# Patient Record
Sex: MALE | Race: WHITE | NOT HISPANIC OR LATINO | Employment: FULL TIME | URBAN - METROPOLITAN AREA
[De-identification: names, ages, dates, MRNs, and addresses within clinical notes are randomized per-mention and may not be internally consistent; named-entity substitution may affect disease eponyms.]

---

## 2023-12-06 ENCOUNTER — OFFICE VISIT (OUTPATIENT)
Dept: INTERNAL MEDICINE | Facility: CLINIC | Age: 33
End: 2023-12-06
Payer: COMMERCIAL

## 2023-12-06 VITALS
OXYGEN SATURATION: 95 % | HEART RATE: 85 BPM | WEIGHT: 315 LBS | TEMPERATURE: 97.1 F | HEIGHT: 70 IN | SYSTOLIC BLOOD PRESSURE: 104 MMHG | BODY MASS INDEX: 45.1 KG/M2 | DIASTOLIC BLOOD PRESSURE: 68 MMHG

## 2023-12-06 DIAGNOSIS — F33.1 MODERATE EPISODE OF RECURRENT MAJOR DEPRESSIVE DISORDER: ICD-10-CM

## 2023-12-06 DIAGNOSIS — Z78.9 MALE-TO-FEMALE TRANSGENDER PERSON: Primary | ICD-10-CM

## 2023-12-06 NOTE — PROGRESS NOTES
"Chief Complaint  Moderate episode of recurrent major depressive disorder (3 month f/u- Wanting to send Zoloft to mail order )    Subjective      History of Present Illness  Iris Cardozo is a 33 y.o. adult who presents to [facility] with a past medical history of  Past Medical History:   Diagnosis Date    Anxiety     Depression      States that she is doing well  Doing well with Endo and tolerating current medications    Blood pressure is doing well    Still planning for possible orchiectomy and/or laser hair removal in the future    Feels like anxiety is doing well on the Zoloft    Notices the Zoloft helped decrease his sexual drive which has actually been good    Objective   Vital Signs:   Vitals:    12/06/23 0904   BP: 104/68   Pulse: 85   Temp: 97.1 °F (36.2 °C)   TempSrc: Temporal   SpO2: 95%   Weight: (!) 155 kg (342 lb 3.2 oz)   Height: 177.8 cm (70\")     Body mass index is 49.1 kg/m².    Wt Readings from Last 3 Encounters:   12/06/23 (!) 155 kg (342 lb 3.2 oz)   10/21/23 (!) 157 kg (345 lb 7.4 oz)   10/15/23 (!) 159 kg (351 lb 9.6 oz)     BP Readings from Last 3 Encounters:   12/06/23 104/68   10/20/23 140/88   10/15/23 120/76       Health Maintenance   Topic Date Due    PAP SMEAR  Never done    HEPATITIS C SCREENING  01/01/2027 (Originally 6/11/2021)    BMI FOLLOWUP  05/01/2024    ANNUAL PHYSICAL  09/06/2024    TDAP/TD VACCINES (5 - Td or Tdap) 09/06/2033    COVID-19 Vaccine  Completed    INFLUENZA VACCINE  Completed    Pneumococcal Vaccine 0-64  Aged Out       Physical Exam  Vitals reviewed.   Constitutional:       Appearance: Normal appearance. She is well-developed. She is obese.   HENT:      Head: Normocephalic and atraumatic.      Right Ear: External ear normal.      Left Ear: External ear normal.   Eyes:      Conjunctiva/sclera: Conjunctivae normal.      Pupils: Pupils are equal, round, and reactive to light.   Cardiovascular:      Rate and Rhythm: " Normal rate and regular rhythm.      Heart sounds: No murmur heard.     No friction rub. No gallop.   Pulmonary:      Effort: Pulmonary effort is normal.      Breath sounds: Normal breath sounds. No wheezing or rhonchi.   Skin:     General: Skin is warm and dry.   Neurological:      Mental Status: She is alert and oriented to person, place, and time.   Psychiatric:         Mood and Affect: Affect normal.         Behavior: Behavior normal.         Thought Content: Thought content normal.            Result Review :  The following data was reviewed by: Radha Salmeron MD on 12/06/2023:      Procedures        Assessment and Plan   Diagnoses and all orders for this visit:    1. Male-to-female transgender person (Primary)  Comments:  Doing well continue to work with endocrinology they we will check labs we will review    2. Moderate episode of recurrent major depressive disorder  Comments:  Will send Zoloft doing quite well  Orders:  -     sertraline (ZOLOFT) 50 MG tablet; Take 1 tablet by mouth Daily.  Dispense: 90 tablet; Refill: 0                  FOLLOW UP  Return in about 3 months (around 3/6/2024).  Patient was given instructions and counseling regarding her condition or for health maintenance advice. Please see specific information pulled into the AVS if appropriate.       Radha Salmeron MD  12/06/23  10:16 EST    CURRENT & DISCONTINUED MEDICATIONS  Current Outpatient Medications   Medication Instructions    acetaminophen (TYLENOL) 500 mg, Oral, Every 6 Hours PRN    cetirizine (ZYRTEC) 10 mg, Oral, Daily    estradiol (ESTRACE) 1.5 mg, Oral    fluticasone (FLONASE) 50 MCG/ACT nasal spray No dose, route, or frequency recorded.    sertraline (ZOLOFT) 50 mg, Oral, Daily    spironolactone (ALDACTONE) 100 mg, Oral, 2 Times Daily       Medications Discontinued During This Encounter   Medication Reason    amoxicillin-clavulanate (AUGMENTIN) 875-125 MG per tablet *Therapy completed    azelastine (ASTELIN) 0.1 % nasal  spray *Therapy completed    brompheniramine-pseudoephedrine-DM 30-2-10 MG/5ML syrup *Therapy completed    sertraline (ZOLOFT) 50 MG tablet Reorder

## 2024-02-16 DIAGNOSIS — F33.1 MODERATE EPISODE OF RECURRENT MAJOR DEPRESSIVE DISORDER: ICD-10-CM

## 2024-03-06 ENCOUNTER — OFFICE VISIT (OUTPATIENT)
Dept: INTERNAL MEDICINE | Facility: CLINIC | Age: 34
End: 2024-03-06
Payer: COMMERCIAL

## 2024-03-06 VITALS
WEIGHT: 315 LBS | OXYGEN SATURATION: 96 % | HEART RATE: 95 BPM | DIASTOLIC BLOOD PRESSURE: 80 MMHG | TEMPERATURE: 96.6 F | BODY MASS INDEX: 45.1 KG/M2 | SYSTOLIC BLOOD PRESSURE: 100 MMHG | HEIGHT: 70 IN

## 2024-03-06 DIAGNOSIS — F41.9 ANXIETY: Primary | ICD-10-CM

## 2024-03-06 DIAGNOSIS — I10 HYPERTENSION, UNSPECIFIED TYPE: ICD-10-CM

## 2024-03-06 DIAGNOSIS — F33.1 MODERATE EPISODE OF RECURRENT MAJOR DEPRESSIVE DISORDER: ICD-10-CM

## 2024-03-06 DIAGNOSIS — Z78.9 MALE-TO-FEMALE TRANSGENDER PERSON: ICD-10-CM

## 2024-03-06 PROCEDURE — 99214 OFFICE O/P EST MOD 30 MIN: CPT | Performed by: INTERNAL MEDICINE

## 2024-03-06 RX ORDER — ESTRADIOL 2 MG/1
2 TABLET ORAL 2 TIMES DAILY
COMMUNITY
Start: 2023-12-15

## 2024-03-06 NOTE — PROGRESS NOTES
"Chief Complaint  Male-to-female transgender person (Vevletplasty scheduled on 4/20/24) and Depression (3 month f/u on the Zoloft 50 mg QD, doing good.)    Subjective      History of Present Illness  The patient presents for preoperative evaluation.    She is scheduled for a vulvoplasty  on 04/20/2024 by Dr. Moreno Armenta. She will be staying 3 to 4 days. Dr. Moreno Armenta wanted her to get a couple of tests done including nicotine testing and an EKG. She does not smoke. She has occasional breast tenderness since starting estrogen. She denies any chest discomfort with walking. She gets short of breath when she exercises, but other than that, she feels like she has not had as good breathing since she had COVID-19. She thinks it could also be due to her weight.    Her depression medications are working well for her.         Objective   Vital Signs:   Vitals:    03/06/24 1215   BP: 100/80   Pulse: 95   Temp: 96.6 °F (35.9 °C)   TempSrc: Temporal   SpO2: 96%   Weight: (!) 156 kg (344 lb 6.4 oz)   Height: 177.8 cm (70\")   PainSc: 0-No pain     Body mass index is 49.42 kg/m².    Wt Readings from Last 3 Encounters:   03/06/24 (!) 156 kg (344 lb 6.4 oz)   12/06/23 (!) 155 kg (342 lb 3.2 oz)   10/21/23 (!) 157 kg (345 lb 7.4 oz)     BP Readings from Last 3 Encounters:   03/06/24 100/80   12/06/23 104/68   10/20/23 140/88       Health Maintenance   Topic Date Due    PAP SMEAR  Never done    HEPATITIS C SCREENING  01/01/2027 (Originally 6/11/2021)    BMI FOLLOWUP  05/01/2024    ANNUAL PHYSICAL  09/06/2024    TDAP/TD VACCINES (5 - Td or Tdap) 09/06/2033    COVID-19 Vaccine  Completed    INFLUENZA VACCINE  Completed    Pneumococcal Vaccine 0-64  Aged Out       Physical Exam  Vitals reviewed.   Constitutional:       Appearance: Normal appearance. She is well-developed.   HENT:      Head: Normocephalic and atraumatic.      Right Ear: External ear normal.      Left Ear: External ear normal.   Eyes:      Conjunctiva/sclera: Conjunctivae " normal.      Pupils: Pupils are equal, round, and reactive to light.   Cardiovascular:      Rate and Rhythm: Normal rate and regular rhythm.      Heart sounds: No murmur heard.     No friction rub. No gallop.   Pulmonary:      Effort: Pulmonary effort is normal.      Breath sounds: Normal breath sounds. No wheezing or rhonchi.   Skin:     General: Skin is warm and dry.   Neurological:      Mental Status: She is alert and oriented to person, place, and time.   Psychiatric:         Mood and Affect: Affect normal.         Behavior: Behavior normal.         Thought Content: Thought content normal.        Physical Exam        Result Review :  The following data was reviewed by: Radha Salmeron MD on 03/06/2024:         Results          ECG 12 Lead    Date/Time: 3/7/2024 1:41 PM  Performed by: Radha Salmeron MD    Authorized by: Radha Salmeron MD  Previous ECG: no previous ECG available  Rhythm: sinus rhythm    Clinical impression: normal ECG                Assessment & Plan  1. Preoperative evaluation.  I will obtain CBC, CMP, and coagulation panel. I will also obtain an EKG.  Appears to have no higher risks for surgery.    2. Depression.  She is doing well on her current medication regimen.    Follow-up  The patient will follow up in 3 months.    Assessment & Plan  Anxiety    Moderate episode of recurrent major depressive disorder    Hypertension, unspecified type    Male-to-female transgender person    Orders Placed This Encounter   Procedures    Comprehensive metabolic panel    APTT    Protime-INR    Thrombin Time    Fibrinogen    Nicotine & Metabolite, Quant    ECG 12 Lead    CBC w AUTO Differential                         FOLLOW UP  Return in about 3 months (around 6/6/2024).  Patient was given instructions and counseling regarding her condition or for health maintenance advice. Please see specific information pulled into the AVS if appropriate.       Radha Salmeron MD  03/07/24  13:20  EST    CURRENT & DISCONTINUED MEDICATIONS  Current Outpatient Medications   Medication Instructions    acetaminophen (TYLENOL) 500 mg, Oral, Every 6 Hours PRN    cetirizine (ZYRTEC) 10 mg, Oral, Daily    estradiol (ESTRACE) 2 mg, Oral, 2 Times Daily    fluticasone (FLONASE) 50 MCG/ACT nasal spray No dose, route, or frequency recorded.    sertraline (ZOLOFT) 50 mg, Oral, Daily    spironolactone (ALDACTONE) 100 mg, Oral, 2 Times Daily       Medications Discontinued During This Encounter   Medication Reason    estradiol (ESTRACE) 1 MG tablet *Therapy completed        Patient or patient representative verbalized consent for the use of Ambient Listening during the visit with  Radha Salmeron MD for chart documentation. 3/7/2024  13:42 EST

## 2024-03-07 PROCEDURE — 93000 ELECTROCARDIOGRAM COMPLETE: CPT | Performed by: INTERNAL MEDICINE

## 2024-04-15 ENCOUNTER — PATIENT MESSAGE (OUTPATIENT)
Dept: INTERNAL MEDICINE | Facility: CLINIC | Age: 34
End: 2024-04-15
Payer: COMMERCIAL

## 2024-04-15 NOTE — LETTER
[address] [facility]  [address]  [facility] [address]  Phone: [phone]  Fax: [phone]       To whom it may concern,

## 2024-04-23 NOTE — TELEPHONE ENCOUNTER
Yes that is fine.  Just have it say I am seeing nicholas and have diagnosed him with ADHD and he is currently receiving medical treatment which I am managing.  Thanks!

## 2024-05-07 ENCOUNTER — TELEPHONE (OUTPATIENT)
Dept: INTERNAL MEDICINE | Facility: CLINIC | Age: 34
End: 2024-05-07
Payer: COMMERCIAL

## 2024-05-26 ENCOUNTER — LAB REQUISITION (OUTPATIENT)
Dept: LAB | Facility: HOSPITAL | Age: 34
End: 2024-05-26
Payer: COMMERCIAL

## 2024-05-26 DIAGNOSIS — F64.1 DUAL ROLE TRANSVESTISM: ICD-10-CM

## 2024-05-26 LAB
ANION GAP SERPL CALCULATED.3IONS-SCNC: 8.8 MMOL/L (ref 5–15)
BASOPHILS # BLD AUTO: 0.02 10*3/MM3 (ref 0–0.2)
BASOPHILS NFR BLD AUTO: 0.1 % (ref 0–1.5)
BUN SERPL-MCNC: 8 MG/DL (ref 6–20)
BUN/CREAT SERPL: 10.5 (ref 7–25)
CALCIUM SPEC-SCNC: 8.5 MG/DL (ref 8.6–10.5)
CHLORIDE SERPL-SCNC: 102 MMOL/L (ref 98–107)
CO2 SERPL-SCNC: 26.2 MMOL/L (ref 22–29)
CREAT SERPL-MCNC: 0.76 MG/DL (ref 0.76–1.27)
DEPRECATED RDW RBC AUTO: 40 FL (ref 37–54)
EGFRCR SERPLBLD CKD-EPI 2021: 121 ML/MIN/1.73
EOSINOPHIL # BLD AUTO: 0 10*3/MM3 (ref 0–0.4)
EOSINOPHIL NFR BLD AUTO: 0 % (ref 0.3–6.2)
ERYTHROCYTE [DISTWIDTH] IN BLOOD BY AUTOMATED COUNT: 11.6 % (ref 12.3–15.4)
GLUCOSE SERPL-MCNC: 151 MG/DL (ref 65–99)
HCT VFR BLD AUTO: 33.9 % (ref 37.5–51)
HGB BLD-MCNC: 11.1 G/DL (ref 13–17.7)
IMM GRANULOCYTES # BLD AUTO: 0.07 10*3/MM3 (ref 0–0.05)
IMM GRANULOCYTES NFR BLD AUTO: 0.5 % (ref 0–0.5)
LYMPHOCYTES # BLD AUTO: 1.69 10*3/MM3 (ref 0.7–3.1)
LYMPHOCYTES NFR BLD AUTO: 11.4 % (ref 19.6–45.3)
MCH RBC QN AUTO: 30.8 PG (ref 26.6–33)
MCHC RBC AUTO-ENTMCNC: 32.7 G/DL (ref 31.5–35.7)
MCV RBC AUTO: 94.2 FL (ref 79–97)
MONOCYTES # BLD AUTO: 1.26 10*3/MM3 (ref 0.1–0.9)
MONOCYTES NFR BLD AUTO: 8.5 % (ref 5–12)
NEUTROPHILS NFR BLD AUTO: 11.8 10*3/MM3 (ref 1.7–7)
NEUTROPHILS NFR BLD AUTO: 79.5 % (ref 42.7–76)
NRBC BLD AUTO-RTO: 0 /100 WBC (ref 0–0.2)
PLATELET # BLD AUTO: 247 10*3/MM3 (ref 140–450)
PMV BLD AUTO: 9.9 FL (ref 6–12)
POTASSIUM SERPL-SCNC: 3.9 MMOL/L (ref 3.5–5.2)
RBC # BLD AUTO: 3.6 10*6/MM3 (ref 4.14–5.8)
SODIUM SERPL-SCNC: 137 MMOL/L (ref 136–145)
WBC NRBC COR # BLD AUTO: 14.84 10*3/MM3 (ref 3.4–10.8)

## 2024-05-26 PROCEDURE — 85025 COMPLETE CBC W/AUTO DIFF WBC: CPT | Performed by: UROLOGY

## 2024-05-26 PROCEDURE — 80048 BASIC METABOLIC PNL TOTAL CA: CPT | Performed by: UROLOGY

## 2024-06-28 ENCOUNTER — OFFICE VISIT (OUTPATIENT)
Dept: INTERNAL MEDICINE | Facility: CLINIC | Age: 34
End: 2024-06-28
Payer: COMMERCIAL

## 2024-06-28 ENCOUNTER — PRIOR AUTHORIZATION (OUTPATIENT)
Dept: INTERNAL MEDICINE | Facility: CLINIC | Age: 34
End: 2024-06-28

## 2024-06-28 VITALS
BODY MASS INDEX: 45.1 KG/M2 | DIASTOLIC BLOOD PRESSURE: 78 MMHG | TEMPERATURE: 97.7 F | HEIGHT: 70 IN | OXYGEN SATURATION: 96 % | RESPIRATION RATE: 20 BRPM | HEART RATE: 95 BPM | SYSTOLIC BLOOD PRESSURE: 124 MMHG | WEIGHT: 315 LBS

## 2024-06-28 DIAGNOSIS — Z78.9 MALE-TO-FEMALE TRANSGENDER PERSON: ICD-10-CM

## 2024-06-28 DIAGNOSIS — E66.01 CLASS 3 SEVERE OBESITY WITHOUT SERIOUS COMORBIDITY WITH BODY MASS INDEX (BMI) OF 50.0 TO 59.9 IN ADULT, UNSPECIFIED OBESITY TYPE: Primary | ICD-10-CM

## 2024-06-28 PROCEDURE — 99214 OFFICE O/P EST MOD 30 MIN: CPT | Performed by: INTERNAL MEDICINE

## 2024-06-28 RX ORDER — SEMAGLUTIDE 0.25 MG/.5ML
0.25 INJECTION, SOLUTION SUBCUTANEOUS WEEKLY
Qty: 2 ML | Refills: 1 | Status: SHIPPED | OUTPATIENT
Start: 2024-06-28 | End: 2024-07-02 | Stop reason: SDUPTHER

## 2024-06-28 RX ORDER — SEMAGLUTIDE 0.25 MG/.5ML
0.25 INJECTION, SOLUTION SUBCUTANEOUS WEEKLY
Qty: 2.5 ML | Refills: 1 | Status: SHIPPED | OUTPATIENT
Start: 2024-06-28 | End: 2024-06-28

## 2024-06-28 RX ORDER — IBUPROFEN 800 MG/1
800 TABLET ORAL EVERY 8 HOURS PRN
COMMUNITY

## 2024-06-28 NOTE — TELEPHONE ENCOUNTER
Wegovy 0.25MG/0.5ML auto-injectors    The authorization is valid from 05/29/2024 through 12/25/2024. A letter of explanation will also be  mailed to the patient.

## 2024-06-28 NOTE — PROGRESS NOTES
"Chief Complaint  Follow-up (3 month follow up for hypertension, depression, anxiety, also post surgery ), Anxiety, Hypertension, and Depression      Subjective      History of Present Illness  The patient presents for evaluation status post vulvoplasty    The patient reports overall good health post-surgery, however, she experiences pain around the clitori and perineal area. She has experienced some bruising in the vaginal area over the past few days, which has since resolved. She has observed that her right labia is more mobile than the left, with the left side exhibiting more swelling than the right. Her urinary function is normal, with no reported dysuria. She has undergone 97 absorbable sutures and has a follow-up appointment scheduled for next week. She denies experiencing any chest discomfort or respiratory distress. She applied bacitracin to the area the previous night.    She is frustrated with her weight and would like to try something to help with this         Objective   Vital Signs:   Vitals:    06/28/24 1149   BP: 124/78   BP Location: Right arm   Patient Position: Sitting   Cuff Size: Adult   Pulse: 95   Resp: 20   Temp: 97.7 °F (36.5 °C)   TempSrc: Temporal   SpO2: 96%   Weight: (!) 158 kg (349 lb)   Height: 176.5 cm (69.5\")     Body mass index is 50.8 kg/m².    Wt Readings from Last 3 Encounters:   06/28/24 (!) 158 kg (349 lb)   03/06/24 (!) 156 kg (344 lb 6.4 oz)   12/06/23 (!) 155 kg (342 lb 3.2 oz)     BP Readings from Last 3 Encounters:   06/28/24 124/78   03/06/24 100/80   12/06/23 104/68       Health Maintenance   Topic Date Due    BMI FOLLOWUP  05/01/2024    HEPATITIS C SCREENING  01/01/2027 (Originally 6/11/2021)    INFLUENZA VACCINE  08/01/2024    ANNUAL PHYSICAL  09/06/2024    TDAP/TD VACCINES (5 - Td or Tdap) 09/06/2033    COVID-19 Vaccine  Completed    Pneumococcal Vaccine 0-64  Aged Out    PAP SMEAR  Discontinued       Physical Exam  Vitals reviewed.   Constitutional:       Appearance: " Normal appearance. She is well-developed.   HENT:      Head: Normocephalic and atraumatic.      Right Ear: External ear normal.      Left Ear: External ear normal.   Eyes:      Conjunctiva/sclera: Conjunctivae normal.      Pupils: Pupils are equal, round, and reactive to light.   Cardiovascular:      Rate and Rhythm: Normal rate and regular rhythm.      Heart sounds: No murmur heard.     No friction rub. No gallop.   Pulmonary:      Effort: Pulmonary effort is normal.      Breath sounds: Normal breath sounds. No wheezing or rhonchi.   Genitourinary:     Comments: Surgical area is healing very well; vulva still somewhat swollen but doing well.  Incision sites not infected.  Skin:     General: Skin is warm and dry.   Neurological:      Mental Status: She is alert and oriented to person, place, and time.   Psychiatric:         Mood and Affect: Affect normal.         Behavior: Behavior normal.         Thought Content: Thought content normal.        Physical Exam  Heart sounds are normal.      Result Review :  The following data was reviewed by: Radha Salmeron MD on 06/28/2024:         Results             Procedures            Assessment & Plan  Class 3 severe obesity without serious comorbidity with body mass index (BMI) of 50.0 to 59.9 in adult, unspecified obesity type    Male-to-female transgender person       New Medications Ordered This Visit   Medications    Semaglutide-Weight Management (Wegovy) 0.25 MG/0.5ML solution auto-injector     Sig: Inject 0.5 mL under the skin into the appropriate area as directed 1 (One) Time Per Week.     Dispense:  2 mL     Refill:  1          Assessment & Plan  1. Post-surgery status.  The patient's surgical wound appears to be healing well. The patient is advised to continue applying bacitracin until the sutures fall out and follow up with urolgoy    2. Obesity  Will try wegovy; warned of side effects    Patient or patient representative verbalized consent for the use of Ambient  Listening during the visit with  Radha Salmeron MD for chart documentation. 6/28/2024  13:04 EDT      FOLLOW UP  No follow-ups on file.  Patient was given instructions and counseling regarding her condition or for health maintenance advice. Please see specific information pulled into the AVS if appropriate.     Radha Salmeron MD  06/28/24  13:17 EDT    CURRENT & DISCONTINUED MEDICATIONS  Current Outpatient Medications   Medication Instructions    acetaminophen (TYLENOL) 500 mg, Oral, Every 6 Hours PRN    cetirizine (ZYRTEC) 10 mg, Oral, Daily    estradiol (ESTRACE) 2 mg, Oral, 2 Times Daily    fluticasone (FLONASE) 50 MCG/ACT nasal spray No dose, route, or frequency recorded.    ibuprofen (ADVIL,MOTRIN) 800 mg, Oral, Every 8 Hours PRN    sertraline (ZOLOFT) 50 mg, Oral, Daily    Wegovy 0.25 mg, Subcutaneous, Weekly       Medications Discontinued During This Encounter   Medication Reason    spironolactone (ALDACTONE) 100 MG tablet     Semaglutide-Weight Management (Wegovy) 0.25 MG/0.5ML solution auto-injector

## 2024-07-02 DIAGNOSIS — E66.01 CLASS 3 SEVERE OBESITY WITHOUT SERIOUS COMORBIDITY WITH BODY MASS INDEX (BMI) OF 50.0 TO 59.9 IN ADULT, UNSPECIFIED OBESITY TYPE: Primary | ICD-10-CM

## 2024-07-02 DIAGNOSIS — E66.01 CLASS 3 SEVERE OBESITY WITHOUT SERIOUS COMORBIDITY WITH BODY MASS INDEX (BMI) OF 50.0 TO 59.9 IN ADULT, UNSPECIFIED OBESITY TYPE: ICD-10-CM

## 2024-07-02 RX ORDER — SEMAGLUTIDE 0.25 MG/.5ML
0.25 INJECTION, SOLUTION SUBCUTANEOUS WEEKLY
Qty: 2 ML | Refills: 1 | Status: SHIPPED | OUTPATIENT
Start: 2024-07-02 | End: 2024-07-02 | Stop reason: SDUPTHER

## 2024-07-02 RX ORDER — SEMAGLUTIDE 0.25 MG/.5ML
0.25 INJECTION, SOLUTION SUBCUTANEOUS WEEKLY
Qty: 2 ML | Refills: 1 | Status: SHIPPED | OUTPATIENT
Start: 2024-07-02

## 2024-07-08 ENCOUNTER — PATIENT MESSAGE (OUTPATIENT)
Dept: INTERNAL MEDICINE | Facility: CLINIC | Age: 34
End: 2024-07-08
Payer: COMMERCIAL

## 2024-07-10 RX ORDER — ONDANSETRON 4 MG/1
4 TABLET, FILM COATED ORAL EVERY 8 HOURS PRN
Qty: 10 TABLET | Refills: 1 | Status: SHIPPED | OUTPATIENT
Start: 2024-07-10

## 2024-07-10 NOTE — TELEPHONE ENCOUNTER
From: Iris Cardozo  To: Radha Salmeron  Sent: 7/8/2024 4:10 PM EDT  Subject: Nausea with Wagovy    I have been having some nausea since starting the Wagovy which is a normal side effect. I had some Zofran prescribed from my hospital stay and I used that after talking to my EMT friend who also took Wagovy and it helped a lot. Would you be willing to write me a prescription for Zofran for use with the nausea symptoms?    Thanks  Iris Cardozo

## 2024-08-29 DIAGNOSIS — F33.1 MODERATE EPISODE OF RECURRENT MAJOR DEPRESSIVE DISORDER: ICD-10-CM

## 2024-09-10 ENCOUNTER — PATIENT MESSAGE (OUTPATIENT)
Dept: INTERNAL MEDICINE | Facility: CLINIC | Age: 34
End: 2024-09-10
Payer: COMMERCIAL

## 2024-09-24 ENCOUNTER — PATIENT MESSAGE (OUTPATIENT)
Dept: INTERNAL MEDICINE | Facility: CLINIC | Age: 34
End: 2024-09-24
Payer: COMMERCIAL

## 2024-09-26 RX ORDER — SEMAGLUTIDE 1 MG/.5ML
1 INJECTION, SOLUTION SUBCUTANEOUS WEEKLY
Qty: 6 ML | Refills: 1 | Status: SHIPPED | OUTPATIENT
Start: 2024-09-26

## 2024-10-04 ENCOUNTER — OFFICE VISIT (OUTPATIENT)
Dept: INTERNAL MEDICINE | Facility: CLINIC | Age: 34
End: 2024-10-04
Payer: COMMERCIAL

## 2024-10-04 VITALS
HEIGHT: 70 IN | WEIGHT: 315 LBS | SYSTOLIC BLOOD PRESSURE: 136 MMHG | HEART RATE: 81 BPM | DIASTOLIC BLOOD PRESSURE: 84 MMHG | BODY MASS INDEX: 45.1 KG/M2 | OXYGEN SATURATION: 96 % | TEMPERATURE: 97.2 F

## 2024-10-04 DIAGNOSIS — I10 HYPERTENSION, UNSPECIFIED TYPE: ICD-10-CM

## 2024-10-04 DIAGNOSIS — Z78.9 MALE-TO-FEMALE TRANSGENDER PERSON: ICD-10-CM

## 2024-10-04 DIAGNOSIS — F33.1 MODERATE EPISODE OF RECURRENT MAJOR DEPRESSIVE DISORDER: Primary | ICD-10-CM

## 2024-10-04 DIAGNOSIS — F41.9 ANXIETY: ICD-10-CM

## 2024-10-04 LAB
ALBUMIN SERPL-MCNC: 4.4 G/DL (ref 3.5–5.2)
ALBUMIN UR-MCNC: <1.2 MG/DL
ALBUMIN/GLOB SERPL: 1.5 G/DL
ALP SERPL-CCNC: 67 U/L (ref 39–117)
ALT SERPL W P-5'-P-CCNC: 104 U/L (ref 1–41)
ANION GAP SERPL CALCULATED.3IONS-SCNC: 12.1 MMOL/L (ref 5–15)
AST SERPL-CCNC: 100 U/L (ref 1–40)
BASOPHILS # BLD AUTO: 0.03 10*3/MM3 (ref 0–0.2)
BASOPHILS NFR BLD AUTO: 0.4 % (ref 0–1.5)
BILIRUB SERPL-MCNC: 0.4 MG/DL (ref 0–1.2)
BUN SERPL-MCNC: 8 MG/DL (ref 6–20)
BUN/CREAT SERPL: 11.1 (ref 7–25)
CALCIUM SPEC-SCNC: 9.8 MG/DL (ref 8.6–10.5)
CHLORIDE SERPL-SCNC: 100 MMOL/L (ref 98–107)
CHOLEST SERPL-MCNC: 178 MG/DL (ref 0–200)
CO2 SERPL-SCNC: 26.9 MMOL/L (ref 22–29)
CREAT SERPL-MCNC: 0.72 MG/DL (ref 0.76–1.27)
DEPRECATED RDW RBC AUTO: 41.6 FL (ref 37–54)
EGFRCR SERPLBLD CKD-EPI 2021: 122.9 ML/MIN/1.73
EOSINOPHIL # BLD AUTO: 0.22 10*3/MM3 (ref 0–0.4)
EOSINOPHIL NFR BLD AUTO: 2.9 % (ref 0.3–6.2)
ERYTHROCYTE [DISTWIDTH] IN BLOOD BY AUTOMATED COUNT: 12.8 % (ref 12.3–15.4)
GLOBULIN UR ELPH-MCNC: 2.9 GM/DL
GLUCOSE SERPL-MCNC: 75 MG/DL (ref 65–99)
HBA1C MFR BLD: 5.7 % (ref 4.8–5.6)
HCT VFR BLD AUTO: 42 % (ref 37.5–51)
HDLC SERPL-MCNC: 36 MG/DL (ref 40–60)
HGB BLD-MCNC: 13.6 G/DL (ref 13–17.7)
IMM GRANULOCYTES # BLD AUTO: 0.02 10*3/MM3 (ref 0–0.05)
IMM GRANULOCYTES NFR BLD AUTO: 0.3 % (ref 0–0.5)
LDLC SERPL CALC-MCNC: 112 MG/DL (ref 0–100)
LDLC/HDLC SERPL: 2.99 {RATIO}
LYMPHOCYTES # BLD AUTO: 2.03 10*3/MM3 (ref 0.7–3.1)
LYMPHOCYTES NFR BLD AUTO: 26.8 % (ref 19.6–45.3)
MCH RBC QN AUTO: 28.8 PG (ref 26.6–33)
MCHC RBC AUTO-ENTMCNC: 32.4 G/DL (ref 31.5–35.7)
MCV RBC AUTO: 89 FL (ref 79–97)
MONOCYTES # BLD AUTO: 0.59 10*3/MM3 (ref 0.1–0.9)
MONOCYTES NFR BLD AUTO: 7.8 % (ref 5–12)
NEUTROPHILS NFR BLD AUTO: 4.68 10*3/MM3 (ref 1.7–7)
NEUTROPHILS NFR BLD AUTO: 61.8 % (ref 42.7–76)
NRBC BLD AUTO-RTO: 0 /100 WBC (ref 0–0.2)
PLATELET # BLD AUTO: 232 10*3/MM3 (ref 140–450)
PMV BLD AUTO: 10.8 FL (ref 6–12)
POTASSIUM SERPL-SCNC: 3.9 MMOL/L (ref 3.5–5.2)
PROT SERPL-MCNC: 7.3 G/DL (ref 6–8.5)
RBC # BLD AUTO: 4.72 10*6/MM3 (ref 4.14–5.8)
SODIUM SERPL-SCNC: 139 MMOL/L (ref 136–145)
TRIGL SERPL-MCNC: 172 MG/DL (ref 0–150)
TSH SERPL DL<=0.05 MIU/L-ACNC: 2.02 UIU/ML (ref 0.27–4.2)
VLDLC SERPL-MCNC: 30 MG/DL (ref 5–40)
WBC NRBC COR # BLD AUTO: 7.57 10*3/MM3 (ref 3.4–10.8)

## 2024-10-04 PROCEDURE — 83036 HEMOGLOBIN GLYCOSYLATED A1C: CPT | Performed by: INTERNAL MEDICINE

## 2024-10-04 PROCEDURE — 82043 UR ALBUMIN QUANTITATIVE: CPT | Performed by: INTERNAL MEDICINE

## 2024-10-04 PROCEDURE — 80050 GENERAL HEALTH PANEL: CPT | Performed by: INTERNAL MEDICINE

## 2024-10-04 PROCEDURE — 80061 LIPID PANEL: CPT | Performed by: INTERNAL MEDICINE

## 2024-10-04 NOTE — PROGRESS NOTES
"Chief Complaint  Obesity      Subjective      History of Present Illness  The patient presents for evaluation of multiple medical concerns.    She has recently started taking Wegovy 1 mg, with her second dose administered last night. The medication has been effective in reducing her appetite, leading to a weight loss of 13 pounds over the past 3 months. However, she experiences increased stomach discomfort and nausea when she doesn't eat, which she is trying to manage by consuming fruits and nuts as snacks.  She did notice worsening carsickness she carries Dramamine for nausea, which she describes as tolerable. She has not vomited and did not fill the prescription for nausea.    She is recovering well from surgery, as confirmed by a recent follow-up appointment with Dr. Armenta. Her hormone levels are being managed by Dr. Duff, who aims to maintain her estrogen levels between 100 and 200.     She has undergone laser treatment for hair removal.    She takes Zoloft 50 mg intermittently, which she finds effective, and also takes Zyrtec. She reports no side effects from these medications. She does not have a blood pressure cuff at home.          Objective   Vital Signs:   Vitals:    10/04/24 0817   BP: 136/84   Pulse: 81   Temp: 97.2 °F (36.2 °C)   SpO2: 96%   Weight: (!) 153 kg (337 lb 6.4 oz)   Height: 176.5 cm (69.5\")     Body mass index is 49.11 kg/m².    Wt Readings from Last 3 Encounters:   10/04/24 (!) 153 kg (337 lb 6.4 oz)   06/28/24 (!) 158 kg (349 lb)   03/06/24 (!) 156 kg (344 lb 6.4 oz)     BP Readings from Last 3 Encounters:   10/04/24 136/84   06/28/24 124/78   03/06/24 100/80       Health Maintenance   Topic Date Due    INFLUENZA VACCINE  08/01/2024    ANNUAL PHYSICAL  09/06/2024    HEPATITIS C SCREENING  01/01/2027 (Originally 6/11/2021)    BMI FOLLOWUP  09/26/2025    TDAP/TD VACCINES (5 - Td or Tdap) 09/06/2033    COVID-19 Vaccine  Completed    Pneumococcal Vaccine 0-64  Aged Out    PAP SMEAR  " Discontinued       Physical Exam  Vitals reviewed.   Constitutional:       Appearance: Normal appearance. She is well-developed. She is obese.   HENT:      Head: Normocephalic and atraumatic.      Right Ear: External ear normal.      Left Ear: External ear normal.   Eyes:      Conjunctiva/sclera: Conjunctivae normal.      Pupils: Pupils are equal, round, and reactive to light.   Cardiovascular:      Rate and Rhythm: Normal rate and regular rhythm.      Heart sounds: No murmur heard.     No friction rub. No gallop.   Pulmonary:      Effort: Pulmonary effort is normal.      Breath sounds: Normal breath sounds. No wheezing or rhonchi.   Skin:     General: Skin is warm and dry.   Neurological:      Mental Status: She is alert and oriented to person, place, and time.   Psychiatric:         Mood and Affect: Affect normal.         Behavior: Behavior normal.         Thought Content: Thought content normal.        Physical Exam        Result Review :  The following data was reviewed by: Radha Salmeron MD on 10/04/2024:         Results              Procedures            Assessment & Plan  Moderate episode of recurrent major depressive disorder    Male-to-female transgender person    Hypertension, unspecified type    Anxiety      Orders Placed This Encounter   Procedures    Miscellaneous DME    Fluzone >6mos    COVID-19 (Pfizer) 12yrs+ (COMIRNATY)    Comprehensive Metabolic Panel    Lipid Panel    TSH    MicroAlbumin, Urine, Random - Urine, Clean Catch    Hemoglobin A1c    CBC & Differential             Assessment & Plan  1. Weight management.  She has achieved a weight loss of 13 pounds over the past 3 months. It was explained that rapid weight loss could lead to skin sagging and muscle loss. She was advised to incorporate muscular activities into her routine and to consume small, frequent meals throughout the day to manage nausea associated with Wegovy.     2. Elevated blood pressure.  Her blood pressure is slightly  elevated today. She was advised to monitor her blood pressure at home once or twice a week. A prescription for a blood pressure cuff will be provided.    3. Hormone replacement therapy.  Her estrogen dosage was recently increased to maintain levels around 100-200. Regular blood tests were recommended to monitor hormone levels, especially considering the weight loss.    4. Depression.  She is currently on sertraline 50 mg but has not been taking it consistently. She was encouraged to take it regularly and consider using a pill organizer to help with consistency.    5. Health Maintenance.  Lab tests including blood count, liver enzymes, A1c, cholesterol level, urine protein level, and screening thyroid level will be conducted. Influenza and COVID-19 vaccines were recommended and will be administered today.    Follow-up  Return in 3 months for follow up.    Patient or patient representative verbalized consent for the use of Ambient Listening during the visit with  Radha Salmeron MD for chart documentation. 10/4/2024  09:11 EDT      FOLLOW UP  Return in about 3 months (around 1/4/2025).  Patient was given instructions and counseling regarding her condition or for health maintenance advice. Please see specific information pulled into the AVS if appropriate.     Radha Salmeron MD  10/04/24  09:53 EDT    CURRENT & DISCONTINUED MEDICATIONS  Current Outpatient Medications   Medication Instructions    acetaminophen (TYLENOL) 500 mg, Oral, Every 6 Hours PRN    cetirizine (ZYRTEC) 10 mg, Oral, Daily    estradiol (ESTRACE) 2.5 mg, Oral, 2 Times Daily    fluticasone (FLONASE) 50 MCG/ACT nasal spray No dose, route, or frequency recorded.    ibuprofen (ADVIL,MOTRIN) 800 mg, Oral, Every 8 Hours PRN    ondansetron (ZOFRAN) 4 mg, Oral, Every 8 Hours PRN    sertraline (ZOLOFT) 50 mg, Oral, Daily    Wegovy 1 mg, Subcutaneous, Weekly       There are no discontinued medications.

## 2024-10-08 DIAGNOSIS — R74.8 ELEVATED LIVER ENZYMES: Primary | ICD-10-CM

## 2024-10-11 ENCOUNTER — PATIENT MESSAGE (OUTPATIENT)
Dept: INTERNAL MEDICINE | Facility: CLINIC | Age: 34
End: 2024-10-11
Payer: COMMERCIAL

## 2024-11-01 ENCOUNTER — OFFICE VISIT (OUTPATIENT)
Dept: SLEEP MEDICINE | Facility: HOSPITAL | Age: 34
End: 2024-11-01
Payer: COMMERCIAL

## 2024-11-01 VITALS
HEIGHT: 70 IN | OXYGEN SATURATION: 97 % | DIASTOLIC BLOOD PRESSURE: 79 MMHG | SYSTOLIC BLOOD PRESSURE: 134 MMHG | HEART RATE: 86 BPM | BODY MASS INDEX: 45.1 KG/M2 | WEIGHT: 315 LBS

## 2024-11-01 DIAGNOSIS — F32.A DEPRESSION, UNSPECIFIED DEPRESSION TYPE: ICD-10-CM

## 2024-11-01 DIAGNOSIS — Z91.89 AT RISK FOR EXTREME OBESITY WITH ALVEOLAR HYPOVENTILATION: ICD-10-CM

## 2024-11-01 DIAGNOSIS — Z78.9 MALE-TO-FEMALE TRANSGENDER PERSON: ICD-10-CM

## 2024-11-01 DIAGNOSIS — G47.19 EXCESSIVE DAYTIME SLEEPINESS: ICD-10-CM

## 2024-11-01 DIAGNOSIS — R63.5 WEIGHT GAIN: ICD-10-CM

## 2024-11-01 DIAGNOSIS — Z72.821 INADEQUATE SLEEP HYGIENE: ICD-10-CM

## 2024-11-01 DIAGNOSIS — Z78.9 INTOLERANCE OF CONTINUOUS POSITIVE AIRWAY PRESSURE (CPAP) VENTILATION: ICD-10-CM

## 2024-11-01 DIAGNOSIS — G47.33 OBSTRUCTIVE SLEEP APNEA (ADULT) (PEDIATRIC): Primary | ICD-10-CM

## 2024-11-01 DIAGNOSIS — I10 ESSENTIAL HYPERTENSION: ICD-10-CM

## 2024-11-01 DIAGNOSIS — F41.9 ANXIETY: ICD-10-CM

## 2024-11-01 DIAGNOSIS — G47.34 SLEEP RELATED HYPOXIA: ICD-10-CM

## 2024-11-01 PROCEDURE — G0463 HOSPITAL OUTPT CLINIC VISIT: HCPCS

## 2024-11-01 NOTE — PROGRESS NOTES
[address] [place] [address]  Phone: [phone]  Fax: [phone]    Iris Cardozo  [id number]   1990  34 y.o.  adult        PCP Radha Salmeron MD    Type of service: Initial Sleep Medicine Consult.  Date of service: 11/1/2024      Chief Complaint   Patient presents with    Snoring    Witnessed Apnea     Historians in today's encounter: Patient and Ciera (Wife)   Prior to the onset of the encounter I made sure that the patient provided verbal consent to me to discuss all of the patient's medical information to include any sensitive information/topics in front of the above noted individual also in the room. The patient insisted the above individual stay in the room for entire the encounter to completion.       History of present illness;  The patient was seen today on 11/1/2024 at [facility].   Iris Cardozo, 34 y.o. 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  The patient presents for initial evaluation of sleep disordered breathing.  Patient  denies prior surgery namely tonsillectomy, nasal surgery or UPPP.     **New patient to [facility] sleep [facility] - last seen by Dr. May ~4 years ago**    Loud snoring and witnessed apneas for many years  Last sleep study was 11 years ago  Settings were adjusted for her in the past   Breakthrough apneic event descriptors given to me with current therapy  Her P90 pressure is very high   Air pressures feel too little  Current Device >5 years old elgible for new PAP   No soclean/ozone use  Has gained 43 lbs since last sleep study  Last sleep study titration was recommended  The patient has NEVER HAD A TITRATION    Save what is noted above in HPI, denies any OTHER past known:  cardiopulmonary conditions/neurologic disorders/neuromuscular disorders  Never needed supplemental O2 at home  Denies any opioid  therapy  Denies any metal in head/neck/chest      Further Sleep History:    Bedtime: midnight to  2 am   Rise Time: weekdays 630 to 9 am weekends 7 am to 10 am  Sleep Latency: 15 to 30 minutes  Screens in bed: Yes TV/Video Games/Youtube  Wake after sleep onset: states a couple  Reasons for awakenings: change position  Number of naps per day non-daily up to 1   Naps restorative: no   Caffeine use: 5-6 sodas throughout the day  Excessive daytime sleepiness despite PAP compliance  Sleep disorders screening as judged by sleep medicine physician in room with patient:  Under treated sleep disordered breathing: Yes titration medically necessary  RLS Symptoms/Sleep related movement disorder: No   Bruxism:No   Current sleep related gastroesophageal reflux symptoms:  No   Cataplexy:  No   Sleep Paralysis:  No   Hypnagogic or hypnopompic hallucinations: No   Parasomnias such as sleep walking or sleep eating No     Disclaimer Sleep History: The above sleep history is based on this sleep physician's in room encounter with the patient. Pre encounter self administered questionnaires are taken into consideration and discussed with patient for any discordance. The above documentation by this sleep physician is the most accurate clinical information determined by in room sleep physician encounter with patient.     MEDICAL CONDITIONS (PMH)   HTN  Anxiety  Depression  Transgender male sex to female gender    Social history:  Do you drive a commercial vehicle:  No   Shift work:  No   Tobacco use:  No   Alcohol use: 0 per week  Occupation:     Family Hx (parents and siblings) (pertaining to sleep medicine)  Sleep Apnea sister    Medications: reviewed    Review of systems is negative unless otherwise noted per HPI   Disclaimer History: The above history is based on this sleep physician's in room encounter with the patient. Pre encounter self administered questionnaires are taken into consideration and discussed with  "patient for any discordance. The above documentation by this sleep physician is the most accurate clinical information determined by in room sleep physician encounter with patient.     Physical exam:  Vitals:    11/01/24 1100   BP: 134/79   Pulse: 86   SpO2: 97%   Weight: (!) 149 kg (329 lb 6.4 oz)   Height: 177.8 cm (70\")    Body mass index is 47.26 kg/m².   CONSTITUTIONAL:  Non-toxic, In no overt distress   Head: normocephalic   ENT: Mallampati class IV, + macroglossia, no septal defects   NECK:Neck Circumference: 17 inches,no nuchal rigidity  RESPIRATORY SYSTEM: Breath sounds are clear (no rales, no rhonchi, no wheezes), no accessory muscle use  CARDIOVASULAR SYSTEM: Heart sounds are regular rhythm and normal rate, no rub, no gallop, no edema  NEUROLOGICAL SYSTEM: Oriented x 3, No gross focal deficits   PSYCHIATRIC SYSTEM: Goal oriented, affect full range appropriate      Office notes from care team reviewed:    -10/4/2024 office visit notes reviewed with PCP Dr. Salmeron  - 9/13/2024 office notes reviewed    Labs reviewed.  TSH          10/4/2024    10:28   TSH   TSH 2.020       Most Recent A1C          10/4/2024    10:28   HGBA1C Most Recent   Hemoglobin A1C 5.70     -10/4/2024  Bicarb 26.9  H&H 13.6/42.0  MCV 89  - 5/28/2024  Bicarb 28.99  - 5/27/2024  Bicarb 28.3    Imaging/Diagnostics reviewed:     -5/17/2024 - 8/14/2024  Overall use 100%  4 hour graciela 89%  Device DreamStation 2 auto-CPAP  's 90th percentile pressure is 14 cm H2O  Average time in large leak 1 second  Average AHI 0.9  Settings 4-20 cm H2O  Flex  3  Ramp at 4 cm H2O for 30 minutes    7/31/2013  Patient's weight at the time of the last sleep study over 11 years ago was 286 pounds up 43 lbs today from last sleep study today she is weighing in at 329 lbs (working [facility] PCP with [facility]  PSG  AHI 27.3/h  REM AHI 29.1/hr  Skinny 0/hr  RDI 39.7/hr  O2 lillian 82%  Oximetry minutes less than 90%: 4.1 minutes  Diagnosis by " interpreting physician: Obstructive sleep apnea with fragmented sleep  Recommendation by interpreting physician titration  Interpreting physician: Dr. Martita May    Assessment and plan:  Obstructive sleep apnea, adult [G47.33]  Sleep Related Hypoxia vs At risk for extreme obesity with alveolar hypoventilation [Z91.89]  Excessive daytime sleepiness  Intolerance of continuous positive airway pressure (CPAP) ventilation [Z78.9]    -Positive Airway Pressure Titration ordered to guide management of sleep disordered breathing:    Potentially a good candidate for conversion to BiLevel without a back up rate.Titration per protocol.    -I am going to request we expedite scheduling titration for this patient. I message sleep staff to help facility this.    Patient's symptoms and examination is consistent with sleep apnea. have talked to the patient about the signs and symptoms of sleep apnea. In addition, I have also discussed pathophysiology of sleep apnea.  I also discussed the complications of untreated sleep apnea including effects on hypertension, diabetes mellitus and nonrestorative sleep with hypersomnia which can increase risk for motor vehicle accidents.  Untreated sleep apnea is also a risk factor for development of atrial fibrillation, hypertension, insulin resistance and cerebrovascular accident. Counseled no driving or operating heavy machinery while sleepy. She should discontinue current PAP 3 days leading up to sleep study to get an accurate representation as to what to do for sleep disordered breathing. Counseled plan to arrive to and from sleep center safely do not drive while sleepy (plan for safe transportation if feeling too sleepy and expect to feel very sleepy: friend/family/public transportation). Gave her a new Adriana [place]aldale mask to go home with as a fitpack - Can use PAP in the interim until I have guidance from titration save what's stated above. Patient was given opportunity to ask questions and all  the questions were answered.     Obesity/Weight gain, patient's BMI is Body mass index is 47.26 kg/m².. I have discussed the relationship between weight and sleep apnea.There is direct correlation between weight and severity of sleep apnea.  Weight reduction is encouraged, as it is going to reduce the severity of sleep apnea. I have also discussed with the patient diet and exercise to achieve ideal body weight.  Inadequate sleep hygiene [Z72.821]  Counseled the patient lifestyle modifications as below to be applied especially night of any sleep study, the patient verbalized understanding of same. Follow up with PCP to reinforce my counseling towards healthy lifestyle modifications.  HTN,  Follow up with primary care physician for continued management.   Anxiety/Depression, Follow up with primary care physician for continued management. Comorbid mood disorders make the patient eligible for a trial of PAP therapy even if sleep study reveals mild severity sleep apnea (the patient had at least moderate severity if not severe 11 years ago)  Male-to-female transgender person [Z78.9], Sleep study plan as above. Follow up with PCP/endocrinology as previous.         I have also discussed with the patient the following  Sleep hygiene: Maintaining a regular bedtime and wake time, not to watch television or work in bed, limit caffeine-containing beverages before bed time and avoid naps during the day  Adequate amount of sleep.  Generally most people needs about 7 to 8 hours of sleep.       Patient's questions were answered      I once again thank you for asking me to see this patient in consultation and I have forwarded my opinion and treatment plan.  Please do not hesitate to call me if you have any questions.       Time based visit 92 minutes: to include in room history/exam/extensive counseling/same day review of medications/review of plan with patient/review of labs/review of diagnostics/independent conclusions drawn from  prior diagnostics/ review of prior medical records / took the time to find the patient a mask from sleep lab and gave patient a free fitpack new filippo mask to go home with / complex medical decision making leading to order for Titration study      EMR Dragon/Transcription disclaimer:   Much of this encounter note is an electronic transcription/translation of spoken language to printed text. The electronic translation of spoken language may permit erroneous, or at times, nonsensical words or phrases to be inadvertently transcribed; Although I have reviewed the note for such errors, some may still exist.     NPI #: [id number]    Peña Bae,   Sleep Medicine  [facility]  11/01/24

## 2024-11-08 ENCOUNTER — PATIENT MESSAGE (OUTPATIENT)
Dept: INTERNAL MEDICINE | Facility: CLINIC | Age: 34
End: 2024-11-08
Payer: COMMERCIAL

## 2024-11-13 RX ORDER — SEMAGLUTIDE 0.5 MG/.5ML
0.5 INJECTION, SOLUTION SUBCUTANEOUS WEEKLY
Qty: 6 ML | Refills: 1 | Status: SHIPPED | OUTPATIENT
Start: 2024-11-13

## 2024-11-23 ENCOUNTER — HOSPITAL ENCOUNTER (OUTPATIENT)
Dept: SLEEP MEDICINE | Facility: HOSPITAL | Age: 34
Discharge: HOME | End: 2024-11-23
Admitting: FAMILY MEDICINE
Payer: COMMERCIAL

## 2024-11-23 DIAGNOSIS — G47.19 EXCESSIVE DAYTIME SLEEPINESS: ICD-10-CM

## 2024-11-23 DIAGNOSIS — Z78.9 INTOLERANCE OF CONTINUOUS POSITIVE AIRWAY PRESSURE (CPAP) VENTILATION: ICD-10-CM

## 2024-11-23 DIAGNOSIS — G47.34 SLEEP RELATED HYPOXIA: ICD-10-CM

## 2024-11-23 DIAGNOSIS — G47.33 OBSTRUCTIVE SLEEP APNEA (ADULT) (PEDIATRIC): ICD-10-CM

## 2024-11-23 DIAGNOSIS — Z91.89 AT RISK FOR EXTREME OBESITY WITH ALVEOLAR HYPOVENTILATION: ICD-10-CM

## 2024-11-23 PROCEDURE — 95811 POLYSOM 6/>YRS CPAP 4/> PARM: CPT

## 2024-11-25 DIAGNOSIS — G47.33 OBSTRUCTIVE SLEEP APNEA, ADULT: Primary | ICD-10-CM

## 2024-11-25 DIAGNOSIS — F32.A DEPRESSION, UNSPECIFIED DEPRESSION TYPE: ICD-10-CM

## 2024-11-25 PROCEDURE — 95811 POLYSOM 6/>YRS CPAP 4/> PARM: CPT | Performed by: FAMILY MEDICINE

## 2024-11-27 ENCOUNTER — TELEPHONE (OUTPATIENT)
Dept: SLEEP MEDICINE | Facility: HOSPITAL | Age: 34
End: 2024-11-27
Payer: COMMERCIAL

## 2024-12-16 ENCOUNTER — PRIOR AUTHORIZATION (OUTPATIENT)
Dept: INTERNAL MEDICINE | Facility: CLINIC | Age: 34
End: 2024-12-16
Payer: COMMERCIAL

## 2024-12-16 NOTE — TELEPHONE ENCOUNTER
Wegovy 1MG/0.5ML auto-injectors    The authorization is valid from 11/16/2024 through 12/16/2025. A letter of explanation will also be  mailed to the patient.

## 2025-05-30 LAB
ALBUMIN SERPL-MCNC: 4.2 G/DL (ref 3.5–5.2)
ALBUMIN/GLOB SERPL: 1.2 G/DL
ALP SERPL-CCNC: 75 U/L (ref 39–117)
ALT SERPL W P-5'-P-CCNC: 73 U/L (ref 1–33)
ANION GAP SERPL CALCULATED.3IONS-SCNC: 9.5 MMOL/L (ref 5–15)
AST SERPL-CCNC: 59 U/L (ref 1–32)
BILIRUB SERPL-MCNC: 0.4 MG/DL (ref 0–1.2)
BUN SERPL-MCNC: 7 MG/DL (ref 6–20)
BUN/CREAT SERPL: 8 (ref 7–25)
CALCIUM SPEC-SCNC: 9.5 MG/DL (ref 8.6–10.5)
CHLORIDE SERPL-SCNC: 102 MMOL/L (ref 98–107)
CO2 SERPL-SCNC: 27.5 MMOL/L (ref 22–29)
CREAT SERPL-MCNC: 0.88 MG/DL (ref 0.57–1)
EGFRCR SERPLBLD CKD-EPI 2021: 88 ML/MIN/1.73
GLOBULIN UR ELPH-MCNC: 3.4 GM/DL
GLUCOSE SERPL-MCNC: 82 MG/DL (ref 65–99)
POTASSIUM SERPL-SCNC: 4 MMOL/L (ref 3.5–5.2)
PROT SERPL-MCNC: 7.6 G/DL (ref 6–8.5)
SODIUM SERPL-SCNC: 139 MMOL/L (ref 136–145)